# Patient Record
Sex: MALE | Race: WHITE | Employment: OTHER | URBAN - METROPOLITAN AREA
[De-identification: names, ages, dates, MRNs, and addresses within clinical notes are randomized per-mention and may not be internally consistent; named-entity substitution may affect disease eponyms.]

---

## 2017-03-03 ENCOUNTER — TELEPHONE (OUTPATIENT)
Dept: VASCULAR SURGERY | Age: 67
End: 2017-03-03

## 2017-03-03 NOTE — TELEPHONE ENCOUNTER
Patient's wife called on 03/02/17 to cancel her husbands studies on [address] and follow up visit with Sebastián Rojas, due to a loss of insurance. The wife states patient will be eligible for medicare in July and will reschedule all appointments at that time.

## 2017-07-03 PROBLEM — N52.9 ERECTILE DYSFUNCTION: Status: ACTIVE | Noted: 2017-07-03

## 2019-02-21 PROBLEM — R31.29 MICROHEMATURIA: Status: ACTIVE | Noted: 2019-02-21

## 2019-04-04 PROBLEM — S82.841A FRACTURE OF ANKLE, BIMALLEOLAR, RIGHT, CLOSED: Status: ACTIVE | Noted: 2017-10-10

## 2019-04-04 PROBLEM — I70.229 ATHEROSCLEROSIS OF ARTERY OF EXTREMITY WITH REST PAIN (HCC): Status: ACTIVE | Noted: 2018-01-07

## 2019-04-04 PROBLEM — I73.9 SEVERE PERIPHERAL ARTERIAL DISEASE (HCC): Status: ACTIVE | Noted: 2017-07-03
